# Patient Record
Sex: FEMALE | Race: WHITE | Employment: FULL TIME | URBAN - NONMETROPOLITAN AREA
[De-identification: names, ages, dates, MRNs, and addresses within clinical notes are randomized per-mention and may not be internally consistent; named-entity substitution may affect disease eponyms.]

---

## 2018-12-05 ENCOUNTER — APPOINTMENT (OUTPATIENT)
Dept: GENERAL RADIOLOGY | Age: 28
End: 2018-12-05
Payer: COMMERCIAL

## 2018-12-05 ENCOUNTER — HOSPITAL ENCOUNTER (EMERGENCY)
Age: 28
Discharge: HOME | End: 2018-12-05
Payer: COMMERCIAL

## 2018-12-05 VITALS
SYSTOLIC BLOOD PRESSURE: 128 MMHG | OXYGEN SATURATION: 99 % | TEMPERATURE: 99.2 F | RESPIRATION RATE: 16 BRPM | HEART RATE: 88 BPM | DIASTOLIC BLOOD PRESSURE: 66 MMHG

## 2018-12-05 DIAGNOSIS — S89.91XA INJURY OF RIGHT KNEE, INITIAL ENCOUNTER: Primary | ICD-10-CM

## 2018-12-05 DIAGNOSIS — M25.461 EFFUSION OF RIGHT KNEE: ICD-10-CM

## 2018-12-05 PROCEDURE — 73560 X-RAY EXAM OF KNEE 1 OR 2: CPT

## 2018-12-05 PROCEDURE — 99283 EMERGENCY DEPT VISIT LOW MDM: CPT

## 2018-12-05 ASSESSMENT — ENCOUNTER SYMPTOMS
NAUSEA: 0
COUGH: 0
RHINORRHEA: 0
CONSTIPATION: 0
EYE DISCHARGE: 0
BLOOD IN STOOL: 0
WHEEZING: 0
ABDOMINAL PAIN: 0
EYE REDNESS: 0
VOMITING: 0
DIARRHEA: 0
SORE THROAT: 0
CHEST TIGHTNESS: 0
BACK PAIN: 0
SHORTNESS OF BREATH: 0

## 2018-12-05 ASSESSMENT — PAIN DESCRIPTION - ORIENTATION: ORIENTATION: RIGHT

## 2018-12-05 ASSESSMENT — PAIN SCALES - GENERAL: PAINLEVEL_OUTOF10: 9

## 2018-12-05 ASSESSMENT — PAIN DESCRIPTION - LOCATION: LOCATION: KNEE

## 2018-12-05 ASSESSMENT — PAIN DESCRIPTION - PAIN TYPE: TYPE: ACUTE PAIN

## 2018-12-05 NOTE — ED PROVIDER NOTES
[facility] ED  eMERGENCY dEPARTMENT eNCOUnter      Pt Name: Abner Brothers  MRN: [id number]  [place] 1990  Date of evaluation: 2018  Provider: Sunny Winkler Dr     Chief Complaint   Patient presents with    Knee Pain     right knee pain from slipping on rug         HISTORY OF PRESENT ILLNESS   (Location/Symptom, Timing/Onset, Context/Setting,Quality, Duration, Modifying Factors, Severity)  Note limiting factors. Abner Brothers is a29 y.o. female who presents to the emergency department with complaints of right knee pain onset just prior to arrival.  Patiorts she was bringing her new baby in for blood work when she slipped and fell landing on her knee after slipping on a rug. She denies any head injury. She denies loss of consciousness. She denies any neck pain or back pain. She denies any right hip pain she denies any right ankle pain right foot pain. She rates her pain an 8 out of 10. Reports she is currently on Percocet after . She denies any abdominal pain. She denies any upper extremity injury. She denies any headache or dizziness. Denies chest pain or shortness of breath. No other complaints at this time. HPI    Nursing Notes werereviewed. REVIEW OF SYSTEMS    (2-9 systems for level 4, 10 or more for level 5)     Review of Systems   Constitutional: Negative for chills, diaphoresis and fever. HENT: Negative for congestion, ear pain, rhinorrhea and sore throat. Eyes: Negative for discharge, redness and visual disturbance. Respiratory: Negative for cough, chest tightness, shortness of breath and wheezing. Cardiovascular: Negative for chest pain and palpitations. Gastrointestinal: Negative for abdominal pain, blood in stool, constipation, diarrhea, nausea and vomiting. Endocrine: Negative for polydipsia, polyphagia and polyuria. Genitourinary: Negative for decreased urine volume, difficulty urinating, dysuria, frequency and hematuria. Musculoskeletal: Normal range of motion. She exhibits no edema, tenderness or deformity. No midline bony spinal tenderness. Patient has no bony step-off. Full range of motion without tenderness of bilateral upper and lower extremity is. Full range motion of left lower extremity without any pain. She has pain with range of motion of the right knee. There is a small effusion. There is no deformity. Compartments of the lower leg are soft. There is no tenderness of the right hip no tenderness of the right ankle or foot. She has intact distal pulses sensation capillary refill less than 3 seconds. No skin mottling. Compartments of the lower leg are soft. There are no open wounds. Neurological: She is alert and oriented to person, place, and time. She has normal strength and normal reflexes. She is not disoriented. She displays normal reflexes. No cranial nerve deficit or sensory deficit. She exhibits normal muscle tone. Skin: Skin is warm and dry. No rash noted. She is not diaphoretic. No erythema. Psychiatric: She has a normal mood and affect. Her behavior is normal.   Nursing note and vitals reviewed. DIAGNOSTIC RESULTS     EKG: All EKG's are interpreted by the Emergency Department Physician who either signs orCo-signs this chart in the absence of a cardiologist.      RADIOLOGY:   Non-plainfilm images such as CT, Ultrasound and MRI are read by the radiologist. Plain radiographic images are visualized and preliminarily interpreted by the emergency physician with the below findings:      Interpretationper the Radiologist below, if available at the time of this note:    XR KNEE RIGHT (1-2 VIEWS)   Final Result   Joint effusion but no fracture. XR KNEE RIGHT (1-2 VIEWS)   Final Result   Negative right knee with no acute abnormality.                ED BEDSIDE ULTRASOUND:   Performed by ED Physician - none    LABS:  Labs Reviewed - No data to display    All other labs were within normal range or not returned as of this dictation. EMERGENCY DEPARTMENT COURSE and DIFFERENTIAL DIAGNOSIS/MDM:   Vitals:    Vitals:    12/05/18 1038   BP: 133/64   Pulse: 93   Resp: 20   Temp: 99.5 °F (37.5 °C)   TempSrc: Tympanic   SpO2: 99%         MDM  Bayron Duarte is a 25 y.o. female who presents to the emergency department s/p fall; we will order x-rays to rule out acute fracture, subluxation or other bony abnormality. Patient does have a small effusion noted. Patellar tendon is intact. No deformities no open wounds. X-ray was ordered initially prior to my arrival I did add a sunrise view which is all negative other than a small effusion. I am going to place in a long-leg immobilizer. She has full range of motion of ankle foot and hip. No bony spinal tenderness. Patient is resting comfortably at this time and in no distress. I have updated patient on current results. We discussed at length the patient's diagnosis. Patient understands to follow up with primary care provider in the next 2 days. Patient verbalized understanding of this. We went over medications. Strict return warnings were given. Patient will return to the emergency room as needed with new or worsening complaints. Patient has been given information for orthopedic follow-up. They will call tomorrow to arrange follow-up within the next 48 hours. She understands to remain and long-leg immobilizer until she is seen and cleared and follow-up. As well as on crutches. Procedures    FINAL IMPRESSION      1. Injury of right knee, initial encounter    2.  Effusion of right knee        DISPOSITION/PLAN   DISPOSITION Decision To Discharge 12/05/2018 12:11:52 PM      PATIENT REFERRED TO:  [facility] ED  [address]  [phone]    If symptoms worsen, As needed    Shaka Res, DO  [address]  [phone]    Schedule an appointment as soon as possible for a visit in 2